# Patient Record
Sex: MALE | Race: WHITE | NOT HISPANIC OR LATINO | Employment: OTHER | URBAN - METROPOLITAN AREA
[De-identification: names, ages, dates, MRNs, and addresses within clinical notes are randomized per-mention and may not be internally consistent; named-entity substitution may affect disease eponyms.]

---

## 2024-09-16 ENCOUNTER — APPOINTMENT (EMERGENCY)
Dept: CT IMAGING | Facility: HOSPITAL | Age: 39
End: 2024-09-16
Payer: MEDICARE

## 2024-09-16 ENCOUNTER — HOSPITAL ENCOUNTER (EMERGENCY)
Facility: HOSPITAL | Age: 39
Discharge: HOME | End: 2024-09-16
Attending: INTERNAL MEDICINE
Payer: MEDICARE

## 2024-09-16 VITALS
RESPIRATION RATE: 20 BRPM | SYSTOLIC BLOOD PRESSURE: 126 MMHG | HEART RATE: 98 BPM | TEMPERATURE: 98.7 F | DIASTOLIC BLOOD PRESSURE: 80 MMHG | OXYGEN SATURATION: 98 % | WEIGHT: 186.07 LBS

## 2024-09-16 DIAGNOSIS — L03.90 CELLULITIS: ICD-10-CM

## 2024-09-16 DIAGNOSIS — L02.413 ABSCESS OF RIGHT ARM: Primary | ICD-10-CM

## 2024-09-16 PROBLEM — L02.91 ABSCESS: Status: ACTIVE | Noted: 2024-09-16

## 2024-09-16 LAB
ALBUMIN SERPL BCG-MCNC: 4.3 G/DL (ref 3.5–5)
ALP SERPL-CCNC: 77 U/L (ref 34–104)
ALT SERPL W P-5'-P-CCNC: 17 U/L (ref 7–52)
ANION GAP SERPL CALCULATED.3IONS-SCNC: 4 MMOL/L (ref 4–13)
AST SERPL W P-5'-P-CCNC: 17 U/L (ref 13–39)
BACTERIA UR QL AUTO: ABNORMAL /HPF
BASOPHILS # BLD AUTO: 0.02 THOUSANDS/ΜL (ref 0–0.1)
BASOPHILS NFR BLD AUTO: 0 % (ref 0–1)
BILIRUB SERPL-MCNC: 0.5 MG/DL (ref 0.2–1)
BILIRUB UR QL STRIP: NEGATIVE
BUN SERPL-MCNC: 15 MG/DL (ref 5–25)
CALCIUM SERPL-MCNC: 9.6 MG/DL (ref 8.4–10.2)
CHLORIDE SERPL-SCNC: 102 MMOL/L (ref 96–108)
CLARITY UR: ABNORMAL
CO2 SERPL-SCNC: 32 MMOL/L (ref 21–32)
COLOR UR: ABNORMAL
CREAT SERPL-MCNC: 0.87 MG/DL (ref 0.6–1.3)
EOSINOPHIL # BLD AUTO: 0.07 THOUSAND/ΜL (ref 0–0.61)
EOSINOPHIL NFR BLD AUTO: 1 % (ref 0–6)
ERYTHROCYTE [DISTWIDTH] IN BLOOD BY AUTOMATED COUNT: 12.3 % (ref 11.6–15.1)
GFR SERPL CREATININE-BSD FRML MDRD: 107 ML/MIN/1.73SQ M
GLUCOSE SERPL-MCNC: 91 MG/DL (ref 65–140)
GLUCOSE UR STRIP-MCNC: NEGATIVE MG/DL
HCT VFR BLD AUTO: 42 % (ref 36.5–49.3)
HGB BLD-MCNC: 13.9 G/DL (ref 12–17)
HGB UR QL STRIP.AUTO: NEGATIVE
IMM GRANULOCYTES # BLD AUTO: 0.02 THOUSAND/UL (ref 0–0.2)
IMM GRANULOCYTES NFR BLD AUTO: 0 % (ref 0–2)
KETONES UR STRIP-MCNC: NEGATIVE MG/DL
LEUKOCYTE ESTERASE UR QL STRIP: NEGATIVE
LYMPHOCYTES # BLD AUTO: 1.89 THOUSANDS/ΜL (ref 0.6–4.47)
LYMPHOCYTES NFR BLD AUTO: 21 % (ref 14–44)
MCH RBC QN AUTO: 28.3 PG (ref 26.8–34.3)
MCHC RBC AUTO-ENTMCNC: 33.1 G/DL (ref 31.4–37.4)
MCV RBC AUTO: 86 FL (ref 82–98)
MONOCYTES # BLD AUTO: 0.68 THOUSAND/ΜL (ref 0.17–1.22)
MONOCYTES NFR BLD AUTO: 8 % (ref 4–12)
MUCOUS THREADS UR QL AUTO: ABNORMAL
NEUTROPHILS # BLD AUTO: 6.2 THOUSANDS/ΜL (ref 1.85–7.62)
NEUTS SEG NFR BLD AUTO: 70 % (ref 43–75)
NITRITE UR QL STRIP: NEGATIVE
NON-SQ EPI CELLS URNS QL MICRO: ABNORMAL /HPF
NRBC BLD AUTO-RTO: 0 /100 WBCS
PH UR STRIP.AUTO: 6 [PH] (ref 4.5–8)
PLATELET # BLD AUTO: 259 THOUSANDS/UL (ref 149–390)
PMV BLD AUTO: 10.5 FL (ref 8.9–12.7)
POTASSIUM SERPL-SCNC: 3.8 MMOL/L (ref 3.5–5.3)
PROT SERPL-MCNC: 7.4 G/DL (ref 6.4–8.4)
PROT UR STRIP-MCNC: ABNORMAL MG/DL
RBC # BLD AUTO: 4.91 MILLION/UL (ref 3.88–5.62)
RBC #/AREA URNS AUTO: ABNORMAL /HPF
SODIUM SERPL-SCNC: 138 MMOL/L (ref 135–147)
SP GR UR STRIP.AUTO: >=1.03 (ref 1–1.03)
UROBILINOGEN UR QL STRIP.AUTO: 0.2 E.U./DL
WBC # BLD AUTO: 8.88 THOUSAND/UL (ref 4.31–10.16)
WBC #/AREA URNS AUTO: ABNORMAL /HPF

## 2024-09-16 PROCEDURE — 99244 OFF/OP CNSLTJ NEW/EST MOD 40: CPT | Performed by: SURGERY

## 2024-09-16 PROCEDURE — 73201 CT UPPER EXTREMITY W/DYE: CPT

## 2024-09-16 PROCEDURE — 85025 COMPLETE CBC W/AUTO DIFF WBC: CPT | Performed by: PHYSICIAN ASSISTANT

## 2024-09-16 PROCEDURE — 96361 HYDRATE IV INFUSION ADD-ON: CPT

## 2024-09-16 PROCEDURE — 99285 EMERGENCY DEPT VISIT HI MDM: CPT | Performed by: PHYSICIAN ASSISTANT

## 2024-09-16 PROCEDURE — 10060 I&D ABSCESS SIMPLE/SINGLE: CPT | Performed by: SURGERY

## 2024-09-16 PROCEDURE — 81001 URINALYSIS AUTO W/SCOPE: CPT

## 2024-09-16 PROCEDURE — 96360 HYDRATION IV INFUSION INIT: CPT

## 2024-09-16 PROCEDURE — NC001 PR NO CHARGE: Performed by: SURGERY

## 2024-09-16 PROCEDURE — 99284 EMERGENCY DEPT VISIT MOD MDM: CPT

## 2024-09-16 PROCEDURE — 80053 COMPREHEN METABOLIC PANEL: CPT | Performed by: PHYSICIAN ASSISTANT

## 2024-09-16 PROCEDURE — 36415 COLL VENOUS BLD VENIPUNCTURE: CPT | Performed by: PHYSICIAN ASSISTANT

## 2024-09-16 RX ORDER — SULFAMETHOXAZOLE/TRIMETHOPRIM 800-160 MG
1 TABLET ORAL 2 TIMES DAILY
Qty: 14 TABLET | Refills: 0 | Status: SHIPPED | OUTPATIENT
Start: 2024-09-16 | End: 2024-09-23

## 2024-09-16 RX ORDER — SULFAMETHOXAZOLE/TRIMETHOPRIM 800-160 MG
1 TABLET ORAL ONCE
Status: COMPLETED | OUTPATIENT
Start: 2024-09-16 | End: 2024-09-16

## 2024-09-16 RX ORDER — ALPRAZOLAM 0.25 MG
0.25 TABLET ORAL
COMMUNITY

## 2024-09-16 RX ORDER — ALPRAZOLAM 0.5 MG
0.5 TABLET ORAL
COMMUNITY

## 2024-09-16 RX ORDER — LIDOCAINE HCL/EPINEPHRINE/PF 2%-1:200K
20 VIAL (ML) INJECTION ONCE
Status: COMPLETED | OUTPATIENT
Start: 2024-09-16 | End: 2024-09-16

## 2024-09-16 RX ORDER — BUSPIRONE HYDROCHLORIDE 5 MG/1
5 TABLET ORAL 3 TIMES DAILY
COMMUNITY

## 2024-09-16 RX ORDER — DEXTROAMPHETAMINE SACCHARATE, AMPHETAMINE ASPARTATE MONOHYDRATE, DEXTROAMPHETAMINE SULFATE AND AMPHETAMINE SULFATE 5; 5; 5; 5 MG/1; MG/1; MG/1; MG/1
20 CAPSULE, EXTENDED RELEASE ORAL 2 TIMES DAILY
COMMUNITY

## 2024-09-16 RX ADMIN — IOHEXOL 85 ML: 350 INJECTION, SOLUTION INTRAVENOUS at 18:27

## 2024-09-16 RX ADMIN — SULFAMETHOXAZOLE AND TRIMETHOPRIM 1 TABLET: 800; 160 TABLET ORAL at 21:33

## 2024-09-16 RX ADMIN — SODIUM CHLORIDE 1000 ML: 0.9 INJECTION, SOLUTION INTRAVENOUS at 15:22

## 2024-09-16 RX ADMIN — LIDOCAINE HYDROCHLORIDE,EPINEPHRINE BITARTRATE 20 ML: 20; .005 INJECTION, SOLUTION EPIDURAL; INFILTRATION; INTRACAUDAL; PERINEURAL at 20:29

## 2024-09-16 NOTE — ED PROVIDER NOTES
1. Abscess of right arm    2. Cellulitis      ED Disposition       ED Disposition   Discharge    Condition   Stable    Date/Time   Mon Sep 16, 2024  9:29 PM    Comment   Aden Kruger discharge to home/self care.                   Assessment & Plan       Medical Decision Making  DDX including but not limited to: cellulitis, osteomyelitis, lymphangitis, folliculitis, carbuncle, abscess, rhabdomyolysis, necrotizing fasciitis, allergic reaction, angioedema, DVT.      Plan- will check basic labs and CT upper ext.   Will give IVF   Labs unremarkable. No SIR/sepsis criteria.     CT upper ext- Developing abscess within the subcutaneous tissues of the antecubital fossa at the elbow with adjacent fascial enhancement of the muscle. Mass effect on the adjacent cephalic vein without thrombosis. There is minimal extension of the abscess along the   superficial margin of the forearm extensor muscles at the elbow. No definite radiopaque foreign body.    Discussed results with patient. Discussed inpatient vs outpatient treatment. Patient would prefer to go home instead of staying inpatient/obs. Will discuss with general surgery on call.     Discussed with general surgery on call Dr. Cohen - will see in the ED and perform I+D. See separate consult note. Feels able to be discharged home on bactrim with close follow up/recheck in 2 days.     Patient agreeable to plan.     The management plan was discussed in detail with the patient at bedside and all questions were answered.  Prior to discharge, we provided both verbal and written instructions.  We discussed with the patient the signs and symptoms for which to return to the emergency department.  All questions were answered and patient was comfortable with the plan of care and discharged to home.  Instructed the patient to follow up with the primary care provider and/or specialist provided and their written instructions.  The patient verbalized understanding of our discussion and  plan of care, and agrees to return to the Emergency Department for concerns and progression of illness.     At discharge, I instructed the patient to:  -follow up with pcp  -follow up with the recommended specialists- gen surg.   -return to the ER if symptoms worsened or new symptoms arose  Patient agreed to this plan and was stable at time of discharge.     Amount and/or Complexity of Data Reviewed  Labs: ordered. Decision-making details documented in ED Course.  Radiology: ordered.    Risk  Prescription drug management.      Chief Complaint   Patient presents with    Arm Swelling     RUE pain, swelling and redness. Sx x3 days.     History reviewed. No pertinent past medical history.   History reviewed. No pertinent surgical history.   Prior to Admission medications    Medication Sig Start Date End Date Taking? Authorizing Provider   ALPRAZolam (XANAX) 0.25 mg tablet Take 0.25 mg by mouth daily at bedtime as needed for anxiety   Yes Historical Provider, MD   ALPRAZolam (XANAX) 0.5 mg tablet Take 0.5 mg by mouth daily in the early morning   Yes Historical Provider, MD   amphetamine-dextroamphetamine (ADDERALL XR) 20 MG 24 hr capsule Take 20 mg by mouth 2 (two) times a day Morning and afternoon   Yes Historical Provider, MD   busPIRone (BUSPAR) 5 mg tablet Take 5 mg by mouth 3 (three) times a day   Yes Historical Provider, MD   sulfamethoxazole-trimethoprim (BACTRIM DS) 800-160 mg per tablet Take 1 tablet by mouth 2 (two) times a day for 7 days smx-tmp DS (BACTRIM) 800-160 mg tabs (1tab q12 D10) 9/16/24 9/23/24 Yes Shawna Jones PA-C                     Medications   sodium chloride 0.9 % bolus 1,000 mL (0 mL Intravenous Stopped 9/16/24 1816)   iohexol (OMNIPAQUE) 350 MG/ML injection (MULTI-DOSE) 85 mL (85 mL Intravenous Given 9/16/24 1827)   lidocaine-epinephrine (XYLOCAINE-MPF/EPINEPHRINE) 2 %-1:200,000 injection 20 mL (20 mL Infiltration Given by Other 9/16/24 2029)   sulfamethoxazole-trimethoprim (BACTRIM DS)  800-160 mg per tablet 1 tablet (1 tablet Oral Given 9/16/24 2133)       History of Present Illness       HPI    Patient is a 39 y/o male with a PMH of anxiety and depression who presents for evaluation of right arm abscess. He states a few days ago he was working on a car changing a bumper when he believes he got a piece of sharp plastic or metal in his right arm (AC fossa). He states he pulled it out and thinks he got it all but three days ago he started with redness, swelling, pain and warmth.  He tried warm compresses without relief. No hx of same. Denies hx of IV drug use.     Review of Systems   Constitutional:  Negative for chills and fever.   HENT:  Negative for ear pain and sore throat.    Eyes:  Negative for pain and visual disturbance.   Respiratory:  Negative for cough and shortness of breath.    Cardiovascular:  Negative for chest pain.   Gastrointestinal:  Negative for abdominal pain, diarrhea, nausea and vomiting.   Genitourinary:  Negative for dysuria and hematuria.   Musculoskeletal:  Negative for arthralgias and back pain.   Skin:  Positive for color change. Negative for rash.        Right AC fossa with abscess and cellulitis    Neurological:  Negative for seizures, syncope, weakness and numbness.   All other systems reviewed and are negative.          Objective     ED Triage Vitals   Temperature Pulse Blood Pressure Respirations SpO2 Patient Position - Orthostatic VS   09/16/24 1349 09/16/24 1349 09/16/24 1349 09/16/24 1349 09/16/24 1349 09/16/24 1800   98.7 °F (37.1 °C) (!) 110 137/63 20 99 % Sitting      Temp src Heart Rate Source BP Location FiO2 (%) Pain Score    -- 09/16/24 1800 09/16/24 1800 -- --     Monitor Right arm          Physical Exam  Vitals and nursing note reviewed.   Constitutional:       General: He is not in acute distress.     Appearance: Normal appearance. He is well-developed. He is not ill-appearing, toxic-appearing or diaphoretic.   HENT:      Head: Normocephalic and  atraumatic.      Right Ear: External ear normal.      Left Ear: External ear normal.   Eyes:      Conjunctiva/sclera: Conjunctivae normal.   Cardiovascular:      Rate and Rhythm: Regular rhythm. Tachycardia present.      Heart sounds: No murmur heard.  Pulmonary:      Effort: Pulmonary effort is normal. No respiratory distress.      Breath sounds: Normal breath sounds.   Abdominal:      General: Abdomen is flat. There is no distension.      Palpations: Abdomen is soft.   Musculoskeletal:         General: No swelling. Normal range of motion.      Cervical back: Normal range of motion. No rigidity.   Skin:     General: Skin is warm and dry.      Capillary Refill: Capillary refill takes less than 2 seconds.      Comments: Right AC fossa with abscess and surrounding cellulitis. Induration extends down to forearm. NVI distally. Radial pulse intact.   See picture in media section of chart.    Neurological:      Mental Status: He is alert.   Psychiatric:         Mood and Affect: Mood normal.         Labs Reviewed   URINE MICROSCOPIC - Abnormal       Result Value    RBC, UA 2-4 (*)     WBC, UA 4-10 (*)     Epithelial Cells Occasional      Bacteria, UA Innumerable (*)     MUCUS THREADS Occasional (*)    URINE MACROSCOPIC, POC - Abnormal    Color, UA Deann      Clarity, UA Slightly Cloudy      pH, UA 6.0      Leukocytes, UA Negative      Nitrite, UA Negative      Protein, UA 30 (1+) (*)     Glucose, UA Negative      Ketones, UA Negative      Urobilinogen, UA 0.2      Bilirubin, UA Negative      Occult Blood, UA Negative      Specific Gravity, UA >=1.030      Narrative:     CLINITEK RESULT   CBC AND DIFFERENTIAL    WBC 8.88      RBC 4.91      Hemoglobin 13.9      Hematocrit 42.0      MCV 86      MCH 28.3      MCHC 33.1      RDW 12.3      MPV 10.5      Platelets 259      nRBC 0      Segmented % 70      Immature Grans % 0      Lymphocytes % 21      Monocytes % 8      Eosinophils Relative 1      Basophils Relative 0      Absolute  Neutrophils 6.20      Absolute Immature Grans 0.02      Absolute Lymphocytes 1.89      Absolute Monocytes 0.68      Eosinophils Absolute 0.07      Basophils Absolute 0.02     COMPREHENSIVE METABOLIC PANEL    Sodium 138      Potassium 3.8      Chloride 102      CO2 32      ANION GAP 4      BUN 15      Creatinine 0.87      Glucose 91      Calcium 9.6      AST 17      ALT 17      Alkaline Phosphatase 77      Total Protein 7.4      Albumin 4.3      Total Bilirubin 0.50      eGFR 107      Narrative:     [employer] guidelines for Chronic Kidney Disease (CKD):     Stage 1 with normal or high GFR (GFR > 90 mL/min/1.73 square meters)    Stage 2 Mild CKD (GFR = 60-89 mL/min/1.73 square meters)    Stage 3A Moderate CKD (GFR = 45-59 mL/min/1.73 square meters)    Stage 3B Moderate CKD (GFR = 30-44 mL/min/1.73 square meters)    Stage 4 Severe CKD (GFR = 15-29 mL/min/1.73 square meters)    Stage 5 End Stage CKD (GFR <15 mL/min/1.73 square meters)  Note: GFR calculation is accurate only with a steady state creatinine     CT upper extremity w contrast right   Final Interpretation by Juan Thornton MD (09/16 1928)      Developing abscess within the subcutaneous tissues of the antecubital fossa at the elbow with adjacent fascial enhancement of the muscle. Mass effect on the adjacent cephalic vein without thrombosis. There is minimal extension of the abscess along the    superficial margin of the forearm extensor muscles at the elbow. No definite radiopaque foreign body.         I personally discussed this study with KEN VILLAFANA via HIPAA compliant secure epic chat on 9/16/2024 7:27 PM.               Workstation performed: [id number]             Procedures       Ken Villafana PA-C  09/17/24 0127

## 2024-09-16 NOTE — ED NOTES
Pt reminded urine specimen is needed. Unable to void at this time     Emily Coombs RN  09/16/24 3514

## 2024-09-17 ENCOUNTER — TELEPHONE (OUTPATIENT)
Age: 39
End: 2024-09-17

## 2024-09-17 NOTE — TELEPHONE ENCOUNTER
Pt was  ED 9/16 and was told to follow up with general surgery within 2 days for packing to be removed and changed.  General surgery does not have any available appts until next week, so I transferred pt to wound care to see if he can get an appt with them sooner

## 2024-09-17 NOTE — CONSULTS
Consultation - Surgery-General   Name: Aden Kruger 38 y.o. male I MRN: [id number]  Unit/Bed#: ED-19 I Date of Admission: 9/16/2024   Date of Service: 9/16/2024 I Hospital Day: 0   Consult to surgery general  Consult performed by: Lorenzo Cohen MD  Consult ordered by: Shawna Jones PA-C        Physician Requesting Evaluation: Lu Angel MD   Reason for Evaluation / Principal Problem: AC fossa Abscess    Assessment & Plan  Abscess  39 yo M with Abscess and cellulitic changes of the R AC Fossa, worsening erythema, induration and warmth over the last 3 days.  Patient reports he was working on his car when his right antecubital fossa hit a piece of plastic or metal, the patient reports the foreign body was removed but unsure if there was additional pieces left behind.  CT unrevealing for radiopaque foreign bodies    -Bedside I&D  -Recommend Bactrim for 7 days  -Packed with iodoform, patient instructed on packing changes  -Patient instructed on return precautions  -Patient to follow-up in wound clinic  -Dispo per ED        History of Present Illness   Aden Kruger is a 38 y.o. male who presents with cellulitic changes to the right anterior cubital fossa with worsening erythema induration warmth over the last 3 days after the patient was working on his car when struck by a piece of metal.  The patient noted that there may have been a foreign body in the right antecubital fossa which he removed.  He denies any systemic signs of infection, denies any fevers chills night sweats, no chest pain shortness of breath, no nausea or vomiting.  He denies any abdominal symptoms or urinary complaints at this time.    Review of Systems  I have reviewed the patient's PMH, PSH, Social History, Family History, Meds, and Allergies    Objective      Temp:  [98.7 °F (37.1 °C)] 98.7 °F (37.1 °C)  HR:  [] 98  Resp:  [20] 20  BP: (124-137)/(63-80) 126/80  O2 Device: None (Room air)          I/O         09/15 0701  09/16 0700  09/16 0701  09/17 0700    IV Piggyback  1000    Total Intake(mL/kg)  1000 (11.8)    Net  +1000                Lines/Drains/Airways       Active Status       None                  Physical Exam  Physical Exam:  General: No acute distress, alert and oriented  CV: RRR  Lungs: Normal work of breathing   Abdomen: Soft nontender nondistended  Extremities: Right antecubital fossa with significant induration, cobblestoning, warmth, fluctuant abscess pocket superficial, no signs of necrosis, crepitus or concern for NSTI  Skin: Warm, dry      Lab Results: I have reviewed the following results: CBC/BMP:   .     09/16/24  1521   WBC 8.88   HGB 13.9   HCT 42.0      SODIUM 138   K 3.8      CO2 32   BUN 15   CREATININE 0.87   GLUC 91      Imaging Review: Reviewed radiology reports from this admission including: CT soft tissue.  Other Studies: No additional pertinent studies reviewed.    VTE Pharmacologic Prophylaxis: VTE covered by:    None     VTE Mechanical Prophylaxis: sequential compression device

## 2024-09-17 NOTE — ASSESSMENT & PLAN NOTE
37 yo M with Abscess and cellulitic changes of the R AC Fossa, worsening erythema, induration and warmth over the last 3 days.  Patient reports he was working on his car when his right antecubital fossa hit a piece of plastic or metal, the patient reports the foreign body was removed but unsure if there was additional pieces left behind.  CT unrevealing for radiopaque foreign bodies    -Bedside I&D  -Recommend Bactrim for 7 days  -Packed with iodoform, patient instructed on packing changes  -Patient instructed on return precautions  -Patient to follow-up in wound clinic  -Dispo per ED

## 2024-09-17 NOTE — PROCEDURES
Incision and drain    Date/Time: 9/16/2024 10:06 PM    Performed by: Lorenzo Cohen MD  Authorized by: Lorenzo Cohen MD  Universal Protocol:  procedure performed by consultantConsent: Verbal consent obtained.  Consent given by: patient  Timeout called at: 9/16/2024 9:22 PM.  Patient understanding: patient states understanding of the procedure being performed  Patient identity confirmed: verbally with patient, provided demographic data and hospital-assigned identification number    Patient location:  ED  Location:     Type:  Abscess    Location:  Upper extremity    Upper extremity location: R Antecubital fossa.  Pre-procedure details:     Skin preparation:  Chloraprep  Anesthesia (see MAR for exact dosages):     Anesthesia method:  Local infiltration    Local anesthetic:  Lidocaine 2% WITH epi  Procedure details:     Complexity:  Simple    Incision types:  Elliptical    Scalpel blade:  11    Approach:  Open    Incision depth:  Subcutaneous    Drainage:  Purulent    Drainage amount:  Moderate    Wound treatment:  Packing placed    Packing materials:  1/2 in iodoform gauze  Post-procedure details:     Patient tolerance of procedure:  Tolerated well, no immediate complications